# Patient Record
Sex: MALE | Race: OTHER | Employment: FULL TIME | URBAN - METROPOLITAN AREA
[De-identification: names, ages, dates, MRNs, and addresses within clinical notes are randomized per-mention and may not be internally consistent; named-entity substitution may affect disease eponyms.]

---

## 2018-11-05 ENCOUNTER — OFFICE VISIT (OUTPATIENT)
Dept: FAMILY MEDICINE CLINIC | Age: 68
End: 2018-11-05
Payer: COMMERCIAL

## 2018-11-05 VITALS
WEIGHT: 214.6 LBS | DIASTOLIC BLOOD PRESSURE: 85 MMHG | SYSTOLIC BLOOD PRESSURE: 140 MMHG | BODY MASS INDEX: 30.72 KG/M2 | HEIGHT: 70 IN | OXYGEN SATURATION: 97 % | HEART RATE: 64 BPM

## 2018-11-05 DIAGNOSIS — Z00.00 ROUTINE PHYSICAL EXAMINATION: ICD-10-CM

## 2018-11-05 DIAGNOSIS — E07.9 THYROID DISORDER: ICD-10-CM

## 2018-11-05 DIAGNOSIS — Z00.00 ROUTINE PHYSICAL EXAMINATION: Primary | ICD-10-CM

## 2018-11-05 DIAGNOSIS — F52.21 ED (ERECTILE DYSFUNCTION) OF NON-ORGANIC ORIGIN: ICD-10-CM

## 2018-11-05 DIAGNOSIS — I10 ESSENTIAL HYPERTENSION: ICD-10-CM

## 2018-11-05 DIAGNOSIS — Z13.1 DIABETES MELLITUS SCREENING: ICD-10-CM

## 2018-11-05 DIAGNOSIS — E78.00 HYPERCHOLESTEREMIA: ICD-10-CM

## 2018-11-05 DIAGNOSIS — E55.9 VITAMIN D DEFICIENCY: ICD-10-CM

## 2018-11-05 DIAGNOSIS — Z12.11 SCREENING FOR COLON CANCER: ICD-10-CM

## 2018-11-05 LAB
A/G RATIO: 1.9 (ref 1.1–2.2)
ALBUMIN SERPL-MCNC: 4.8 G/DL (ref 3.4–5)
ALP BLD-CCNC: 49 U/L (ref 40–129)
ALT SERPL-CCNC: 17 U/L (ref 10–40)
ANION GAP SERPL CALCULATED.3IONS-SCNC: 18 MMOL/L (ref 3–16)
AST SERPL-CCNC: 23 U/L (ref 15–37)
BASOPHILS ABSOLUTE: 0.1 K/UL (ref 0–0.2)
BASOPHILS RELATIVE PERCENT: 0.7 %
BILIRUB SERPL-MCNC: 1.2 MG/DL (ref 0–1)
BUN BLDV-MCNC: 16 MG/DL (ref 7–20)
CALCIUM SERPL-MCNC: 9.7 MG/DL (ref 8.3–10.6)
CHLORIDE BLD-SCNC: 104 MMOL/L (ref 99–110)
CHOLESTEROL, TOTAL: 191 MG/DL (ref 0–199)
CO2: 21 MMOL/L (ref 21–32)
CREAT SERPL-MCNC: 1.1 MG/DL (ref 0.8–1.3)
EOSINOPHILS ABSOLUTE: 0.3 K/UL (ref 0–0.6)
EOSINOPHILS RELATIVE PERCENT: 3.8 %
GFR AFRICAN AMERICAN: >60
GFR NON-AFRICAN AMERICAN: >60
GLOBULIN: 2.5 G/DL
GLUCOSE BLD-MCNC: 93 MG/DL (ref 70–99)
HCT VFR BLD CALC: 44.7 % (ref 40.5–52.5)
HDLC SERPL-MCNC: 54 MG/DL (ref 40–60)
HEMOGLOBIN: 14.6 G/DL (ref 13.5–17.5)
LDL CHOLESTEROL CALCULATED: 115 MG/DL
LYMPHOCYTES ABSOLUTE: 1.2 K/UL (ref 1–5.1)
LYMPHOCYTES RELATIVE PERCENT: 15.8 %
MCH RBC QN AUTO: 29.2 PG (ref 26–34)
MCHC RBC AUTO-ENTMCNC: 32.7 G/DL (ref 31–36)
MCV RBC AUTO: 89.3 FL (ref 80–100)
MONOCYTES ABSOLUTE: 0.4 K/UL (ref 0–1.3)
MONOCYTES RELATIVE PERCENT: 5.1 %
NEUTROPHILS ABSOLUTE: 5.7 K/UL (ref 1.7–7.7)
NEUTROPHILS RELATIVE PERCENT: 74.6 %
PDW BLD-RTO: 14.6 % (ref 12.4–15.4)
PLATELET # BLD: 245 K/UL (ref 135–450)
PMV BLD AUTO: 9.2 FL (ref 5–10.5)
POTASSIUM SERPL-SCNC: 4.3 MMOL/L (ref 3.5–5.1)
PROSTATE SPECIFIC ANTIGEN: 1.01 NG/ML (ref 0–4)
RBC # BLD: 5 M/UL (ref 4.2–5.9)
SODIUM BLD-SCNC: 143 MMOL/L (ref 136–145)
TOTAL PROTEIN: 7.3 G/DL (ref 6.4–8.2)
TRIGL SERPL-MCNC: 112 MG/DL (ref 0–150)
TSH SERPL DL<=0.05 MIU/L-ACNC: 8.38 UIU/ML (ref 0.27–4.2)
VLDLC SERPL CALC-MCNC: 22 MG/DL
WBC # BLD: 7.6 K/UL (ref 4–11)

## 2018-11-05 PROCEDURE — 99387 INIT PM E/M NEW PAT 65+ YRS: CPT | Performed by: FAMILY MEDICINE

## 2018-11-05 PROCEDURE — 36415 COLL VENOUS BLD VENIPUNCTURE: CPT | Performed by: FAMILY MEDICINE

## 2018-11-05 RX ORDER — AMLODIPINE BESYLATE 10 MG/1
10 TABLET ORAL DAILY
COMMUNITY
End: 2018-11-05

## 2018-11-05 RX ORDER — SILDENAFIL 100 MG/1
100 TABLET, FILM COATED ORAL PRN
COMMUNITY
End: 2018-11-16 | Stop reason: SDUPTHER

## 2018-11-05 RX ORDER — AMLODIPINE AND VALSARTAN 5; 160 MG/1; MG/1
1 TABLET ORAL DAILY
Qty: 30 TABLET | Refills: 3 | Status: SHIPPED | OUTPATIENT
Start: 2018-11-05

## 2018-11-05 RX ORDER — OMEPRAZOLE 20 MG/1
20 CAPSULE, DELAYED RELEASE ORAL DAILY
COMMUNITY
End: 2018-11-16 | Stop reason: SDUPTHER

## 2018-11-05 RX ORDER — ATORVASTATIN CALCIUM 10 MG/1
10 TABLET, FILM COATED ORAL DAILY
COMMUNITY
End: 2018-11-16 | Stop reason: SDUPTHER

## 2018-11-05 RX ORDER — LEVOTHYROXINE SODIUM 0.1 MG/1
100 TABLET ORAL DAILY
COMMUNITY
End: 2018-11-16 | Stop reason: SDUPTHER

## 2018-11-05 ASSESSMENT — ENCOUNTER SYMPTOMS
ABDOMINAL PAIN: 0
COLOR CHANGE: 0
BACK PAIN: 0
SHORTNESS OF BREATH: 0
APNEA: 0
COUGH: 0
CHEST TIGHTNESS: 0
VOICE CHANGE: 0
ALLERGIC/IMMUNOLOGIC NEGATIVE: 1
EYE PAIN: 0
NAUSEA: 0
SINUS PAIN: 0
SORE THROAT: 0
CHOKING: 0
CONSTIPATION: 0
ABDOMINAL DISTENTION: 0
RHINORRHEA: 0
SINUS PRESSURE: 0
WHEEZING: 0
DIARRHEA: 0
BLOOD IN STOOL: 0
EYE REDNESS: 0
EYE ITCHING: 0
TROUBLE SWALLOWING: 0
PHOTOPHOBIA: 0
VOMITING: 0
EYE DISCHARGE: 0

## 2018-11-05 ASSESSMENT — PATIENT HEALTH QUESTIONNAIRE - PHQ9
2. FEELING DOWN, DEPRESSED OR HOPELESS: 0
SUM OF ALL RESPONSES TO PHQ QUESTIONS 1-9: 0
SUM OF ALL RESPONSES TO PHQ9 QUESTIONS 1 & 2: 0
1. LITTLE INTEREST OR PLEASURE IN DOING THINGS: 0
SUM OF ALL RESPONSES TO PHQ QUESTIONS 1-9: 0

## 2018-11-05 NOTE — PROGRESS NOTES
Eyes: Negative for photophobia, pain, discharge, redness, itching and visual disturbance. Respiratory: Negative for apnea, cough, choking, chest tightness, shortness of breath and wheezing. Cardiovascular: Negative for chest pain, palpitations and leg swelling. Gastrointestinal: Negative for abdominal distention, abdominal pain, blood in stool, constipation, diarrhea, nausea and vomiting. BM regular   Endocrine: Negative for cold intolerance, heat intolerance, polydipsia, polyphagia and polyuria. Thyroid 2009     Genitourinary: Negative for dysuria and frequency. ED   Musculoskeletal: Negative. Negative for back pain and gait problem. Skin: Negative for color change and rash. Allergic/Immunologic: Negative. Negative for environmental allergies and food allergies. Neurological: Negative for dizziness, tremors, light-headedness, numbness and headaches. Psychiatric/Behavioral: Negative for agitation, behavioral problems, decreased concentration and sleep disturbance. The patient is not hyperactive. OBJECTIVE:  BP (!) 140/85   Pulse 64   Ht 5' 9.5\" (1.765 m)   Wt 214 lb 9.6 oz (97.3 kg)   SpO2 97%   BMI 31.24 kg/m²   Physical Exam   Constitutional: He is oriented to person, place, and time. He appears well-developed and well-nourished. HENT:   Head: Normocephalic. Right Ear: External ear normal.   Left Ear: External ear normal.   Nose: Nose normal.   Mouth/Throat: Oropharynx is clear and moist. No oropharyngeal exudate. Eyes: Pupils are equal, round, and reactive to light. Conjunctivae and EOM are normal.   Neck: Normal range of motion. Neck supple. Cardiovascular: Normal rate, regular rhythm and normal heart sounds. No murmur heard. Pulmonary/Chest: Effort normal and breath sounds normal.   Abdominal: Soft. Bowel sounds are normal.   Musculoskeletal: Normal range of motion. Neurological: He is alert and oriented to person, place, and time.  He has normal reflexes. Skin: Skin is warm. Psychiatric: He has a normal mood and affect. His behavior is normal. Judgment and thought content normal.   Vitals reviewed. ASSESSMENT/PLAN:    ICD-10-CM    1. Routine physical examination Z00.00 CBC Auto Differential     Comprehensive Metabolic Panel     Hemoglobin A1C     Lipid Panel     TSH without Reflex     Vitamin D 25 Hydroxy     Psa screening     Testosterone, free, total   2. Screening for colon cancer Z12.11 [facility] - Georgie Kyle MD, Gastroenterology, [facility]   3. Essential hypertension I10 CBC Auto Differential     Comprehensive Metabolic Panel   4. Thyroid disorder E07.9 CBC Auto Differential     TSH without Reflex   5. Hypercholesteremia E78.00 CBC Auto Differential     Comprehensive Metabolic Panel     Lipid Panel   6. ED (erectile dysfunction) of non-organic origin F52.21 Testosterone, free, total   7. Diabetes mellitus screening Z13.1 Hemoglobin A1C   8.  Vitamin D deficiency E55.9 Vitamin D 25 Hydroxy     Declined Flu   Lab as above  Hypertension High reading New Rx   Close f/u in 2 month  He will get BP monitor & diary

## 2018-11-06 LAB
ESTIMATED AVERAGE GLUCOSE: 125.5 MG/DL
HBA1C MFR BLD: 6 %
VITAMIN D 25-HYDROXY: 27.9 NG/ML

## 2018-11-07 LAB
SEX HORMONE BINDING GLOBULIN: 31 NMOL/L (ref 11–80)
TESTOSTERONE FREE-NONMALE: 66.9 PG/ML (ref 47–244)
TESTOSTERONE TOTAL: 325 NG/DL (ref 220–1000)

## 2019-01-16 DIAGNOSIS — E07.9 THYROID DISORDER: Primary | ICD-10-CM

## 2019-01-16 RX ORDER — LEVOTHYROXINE SODIUM 0.1 MG/1
100 TABLET ORAL DAILY
Qty: 30 TABLET | Refills: 0 | Status: SHIPPED | OUTPATIENT
Start: 2019-01-16

## 2019-01-16 RX ORDER — OMEPRAZOLE 20 MG/1
20 CAPSULE, DELAYED RELEASE ORAL DAILY
Qty: 90 CAPSULE | Refills: 0 | Status: SHIPPED | OUTPATIENT
Start: 2019-01-16

## 2019-01-16 RX ORDER — ATORVASTATIN CALCIUM 10 MG/1
10 TABLET, FILM COATED ORAL DAILY
Qty: 90 TABLET | Refills: 1 | Status: SHIPPED | OUTPATIENT
Start: 2019-01-16